# Patient Record
Sex: MALE | Race: OTHER | NOT HISPANIC OR LATINO | URBAN - METROPOLITAN AREA
[De-identification: names, ages, dates, MRNs, and addresses within clinical notes are randomized per-mention and may not be internally consistent; named-entity substitution may affect disease eponyms.]

---

## 2018-04-24 ENCOUNTER — EMERGENCY (EMERGENCY)
Facility: HOSPITAL | Age: 48
LOS: 1 days | Discharge: HOME | End: 2018-04-24
Attending: EMERGENCY MEDICINE
Payer: COMMERCIAL

## 2018-04-24 VITALS
HEIGHT: 71 IN | RESPIRATION RATE: 16 BRPM | TEMPERATURE: 97 F | DIASTOLIC BLOOD PRESSURE: 106 MMHG | WEIGHT: 210.1 LBS | OXYGEN SATURATION: 98 % | SYSTOLIC BLOOD PRESSURE: 159 MMHG | HEART RATE: 79 BPM

## 2018-04-24 VITALS
SYSTOLIC BLOOD PRESSURE: 170 MMHG | OXYGEN SATURATION: 99 % | HEART RATE: 89 BPM | DIASTOLIC BLOOD PRESSURE: 99 MMHG | RESPIRATION RATE: 20 BRPM

## 2018-04-24 PROCEDURE — 73562 X-RAY EXAM OF KNEE 3: CPT | Mod: 26,LT

## 2018-04-24 PROCEDURE — 99284 EMERGENCY DEPT VISIT MOD MDM: CPT

## 2018-04-24 PROCEDURE — 29505 APPLICATION LONG LEG SPLINT: CPT | Mod: LT

## 2018-04-24 PROCEDURE — 99284 EMERGENCY DEPT VISIT MOD MDM: CPT | Mod: 25

## 2018-04-24 PROCEDURE — 96372 THER/PROPH/DIAG INJ SC/IM: CPT | Mod: 59

## 2018-04-24 PROCEDURE — 73562 X-RAY EXAM OF KNEE 3: CPT

## 2018-04-24 RX ORDER — IBUPROFEN 200 MG
600 TABLET ORAL ONCE
Qty: 0 | Refills: 0 | Status: COMPLETED | OUTPATIENT
Start: 2018-04-24 | End: 2018-04-24

## 2018-04-24 RX ADMIN — Medication 600 MILLIGRAM(S): at 16:29

## 2018-04-24 RX ADMIN — Medication 40 MILLIGRAM(S): at 17:29

## 2018-04-24 NOTE — ED PROVIDER NOTE - OBJECTIVE STATEMENT
47 year-old male, no significant PMHX, presents with cc L knee pain x 7 months. Gradual onset of L knee pain, continuous, nontraumatic, worsening with movement, no alleviation with IBU and topical cream. Patient also c/o R lower back pain since 4 days ago, with gradual onset, continuous. 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 Denies fever, chills, weight loss, numbness, tingling, focal weakness, IVDU or any other complaints.

## 2018-04-24 NOTE — ED PROVIDER NOTE - PHYSICAL EXAMINATION
L knee full range of motion. Mild anterior swelling, tenderness to proximal fibular area and generalized anterior knee slight tenderness. No laxity. Distal pulses intact 2+ bilaterally. No midline spinal tenderness. R lower back muscle point tenderness/tenseness.

## 2018-04-24 NOTE — ED PROVIDER NOTE - PROGRESS NOTE DETAILS
Xray shows no acute findings. Incidental FB as reported by patient from injury in the past. Was seen by Dr Owen and given Depo-Medrol injection to R lower back and L: knee. L knee immobilizer applied by Dr Owen. Will dc with ortho follow up. Pt is well appearing walking with steady gait, stable for discharge and follow up without fail with medical doctor. I had a detailed discussion with the patient and/or guardian regarding the historical points, exam findings, and any diagnostic results supporting the discharge diagnosis. Pt educated on care and need for follow up. Strict return instructions and red flag signs and symptoms discussed with patient. Questions answered. Pt shows understanding of discharge information and agrees to follow.

## 2018-04-24 NOTE — ED PROVIDER NOTE - ATTENDING CONTRIBUTION TO CARE
Mild anterior swelling, tenderness to proximal fibular area and generalized anterior knee slight tenderness. FROM of knee. I have discussed the plan with the ACP.

## 2018-04-24 NOTE — ED ADULT NURSE NOTE - OBJECTIVE STATEMENT
pt from home c/o of Lt lower back pain and Lt knee pain x6 month pt is alert awake oriented x3 denies any recent injury ambulatory with steady gait

## 2018-04-24 NOTE — ED PROVIDER NOTE - MEDICAL DECISION MAKING DETAILS
47 year-old male, presents with chronic L knee pain and acute R lower back pain. Well-appearing, neuro vasc intact. Plan: xray of L knee, IBU, Dr Owen consult.

## 2018-04-25 NOTE — CONSULT NOTE ADULT - SUBJECTIVE AND OBJECTIVE BOX
HPI: Patient is a 47 year-old male who presents to the ED c/o left knee pain for the 6-7 months. Patient describes the left knee pain as gradual onset, continuous, nontraumatic, worsening with movement. Patient also c/o R lower back pain since 4 days ago, with gradual onset, continuous. 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       PAST MEDICAL & SURGICAL HISTORY:  No pertinent past medical history    Review of systems: Non Contributory    MEDICATIONS  (STANDING):    Allergies: No known Allergies    Vital Signs Last 24 Hrs  T(C): --  T(F): --  HR: 89 (24 Apr 2018 17:42) (89 - 89)  BP: 170/99 (24 Apr 2018 17:42) (170/99 - 170/99)  BP(mean): --  RR: 20 (24 Apr 2018 17:42) (20 - 20)  SpO2: 99% (24 Apr 2018 17:42) (99% - 99%)    Physical Examination:    Musculoskeletal:   Physical examination of lower back/lumbar spine area shows local point tenderness to palpation of the right lower lumbar/SI joint area. There is palpable paravertebral muscle spasm noted. Range of motion is decreased due to pain.     Left knee: Positive tenderness to palpation of joint line, mild effusion, pain on flexion of the knee, decreased range of motion, positive Betsy maneuver.     Neurovascularly Intact      RADIOLOGY & ADDITIONAL STUDIES: X-ray of left knee done in the ER was reported as: There is no evidence of acute fracture, dislocation, joint space narrowing or soft tissue swelling. No suprapatellar joint effusion is noted. There is a metallic foreign body which appears to be located within the soft tissues of the proximal infragenicular left lower leg; correlate with clinical history.    ASSESSMENT: Lumbar sprain, right SI joint pain/ right paravertebral lower lumbar muscle spasm,     Left knee internal derangement, rule out meniscus tear    PLAN/RECOMMENDATION: Under sterile condition, right lower lumbar/SI joint trigger point area was injected with 40 mg of Depomedrol and 5 CC of lidocaine. Patient tolerated procedure well and expressed relief of pain.     Under sterile condition, left knee was injected with 40 mg of Depomedrol and 5 CC of lidocaine. Patient tolerated procedure well and expressed relief of pain. Long leg splint/immobilizer was applied to left knee.     Apply ice. Take anti-inflammatory medication. Apply Voltaren gel.     Recommend MRI of left knee to rule out meniscus tear (given the metallic artifact in the left leg, the MRI facility needs to be consulted as to whether or not an MRI can be performed)    FOLLOW UP: With office in 1-2 weeks

## 2018-05-16 PROBLEM — Z00.00 ENCOUNTER FOR PREVENTIVE HEALTH EXAMINATION: Status: ACTIVE | Noted: 2018-05-16

## 2018-05-22 ENCOUNTER — APPOINTMENT (OUTPATIENT)
Dept: CARDIOLOGY | Facility: CLINIC | Age: 48
End: 2018-05-22